# Patient Record
(demographics unavailable — no encounter records)

---

## 2017-01-01 NOTE — PN
[place], Progress Note





- [place] Exam


Weight: 8 lb 10 oz


Chest Circumference: 35


Head Circumference: 35


Vital Signs: 


 Vital Signs











Temperature  98.7 F   05/15/17 22:00


 


Pulse Rate  138   17 07:03


 


Respiratory Rate  40   17 07:03


 


Blood Pressure  64/47   17 12:15


 


O2 Sat by Pulse Oximetry (%)      











General Appearance: Yes: No Abnormalities


Skin: Yes: No Abnormalities, Jaundice


Head: Yes: No Abnormalities


Eyes: Yes: No Abnormalities


Ears: Yes: No Abnormalities


Nose: Yes: No Abnormalities


Mouth: Yes: No Abnormalities


Chest: Yes: No Abnormalities


Lungs/Respiratory: Yes: No Abnormalities


Cardiac: Yes: No Abnormalities


Abdomen: Yes: No Abnormalities


Gastrointestinal: Yes: No Abnormalities


Genitalia: No Abnormalities


Genitalia, Male: Yes: Bilateral testes descended


Anus: Yes: No Abnormalities


Extremities: Yes: No Abnormalities


Russell Test: Negative


Ortolani Test: Negative


Femoral Pulse: Strong


Spine: Yes: No Abnormalities


Reflexes: [place]: Present, Rooting: Present, Sucking: Present


Neuro: Yes: No Abnormalities, Alert, Active


Cry: No Abnormalities, Strong





- Other Data/Findings


Labs, Other Data: 


 Intake





Intake, Oral Amount              30


Intake, Oral Amount              10


Intake, Oral Amount              10


Intake, Oral Amount              35





 Output





Number of Voids                  1


Number of Voids                  1


Stool Size                       Moderate


Stool Size                       Small


Stool Size                       Large


[place] Stool Description        Transistional,Pasty


[place] Stool Description        Transistional,Soft


 Stool Description        Transistional,Soft





 Baby's Blood Type, Jose Elias











Cord Blood Type  O POSITIVE   17  06:05    


 


DANIEL, Poly Interpret  Negative  (NEGATIVE)   17  06:05    











Other Findings/Remarks: 


Well  Boy 


Physiologic Jaundice


TC bili 9 this AM


BIlirubin tomorrow AM


Continue Current Care


Discussed with mother








Problem List





- Problems


(1) Single liveborn, born in hospital, delivered by  section


Code(s): Z38.01 - SINGLE LIVEBORN INFANT, DELIVERED BY

## 2017-01-01 NOTE — HP
- Maternal History


Mother's Age: 28yo


 Status: 


Mother's Blood Type: Opos


HBSAG: Negative


Date: 16


RPR: Negative


Date: 16


Group B Strep: Negative


HIV: Negative





- Maternal Risks


OB Risks: post dates obese pre diabetic





 Data





- Admission


Date of Admission: 17


Admission Time: 06:15


Date of Delivery: 17


Time of Delivery: 06:05


Wks Gestation by Dates: 41.4


Wks Gestation by Sono: 41.5


Infant Gender: Male


Type of Delivery: Primary C/S


Apgar Score @1 Minute: 9


Apgar score @ 5 Minutes: 9


Birth Weight: 9 lb


Birth Length: 20.5 in


Head Circumference, Admission: 35


Chest Circumference: 35


Abdominal Girth: 34





- Labs


Labs: 


 Baby's Blood Type, Jose Elias











Cord Blood Type  O POSITIVE   17  06:05    


 


DANIEL, Poly Interpret  Negative  (NEGATIVE)   17  06:05    














 Infant, Physical Exam





-  Infant, Admission Exam


Birth Weight: 9 lb


Birth Length: 20.5 in


Chest Circumference: 35


Initial Vital Signs: 


 Initial Vital Signs











Temp Pulse Resp


 


 97.9 F   138   40 


 


 17 07:03  17 07:03  17 07:03











General Appearance: Yes: No Abnormalities


Skin: Yes: No Abnormalities


Head: Yes: No Abnormalities


Eyes: Yes: No Abnormalities


Ears: Yes: No Abnormalities


Nose: Yes: No Abnormalities


Mouth: Yes: No Abnormalities


Chest: Yes: No Abnormalities


Lungs/Respiratory: Yes: No Abnormalities


Cardiac: Yes: No Abnormalities


Abdomen: Yes: No Abnormalities


Gastrointestinal: Yes: No Abnormalities


Genitalia: No Abnormalities


Genitalia, Male: Yes: Bilateral testes descended


Anus: Yes: No Abnormalities


Extremities: Yes: No Abnormalities


Clavicles: No abnormalities


Spine: Yes: No Abnormalities


Neuro: Yes: No Abnormalities


Cry: Yes: No Abnormalities





- Other Findings/Remarks


Other Findings/Remarks: 


Patient is a well . Continue routine care.


C/S

## 2017-01-01 NOTE — PN
Progress Note (short form)





- Note


Progress Note: 


This is 41 4/7 wks baby boy born to 29yr  via c/s due to Cat II tracing

, cord around the neck, cried well after birth. No active resuscitation. Apgar 

score 9 and 9.


MPMH:unremarkable





General Appearance: Yes: No Abnormalities, Full ROM, Spontaneous movements, Pink


Skin: Yes: No Abnormalities


Head: Yes: Molding


Eyes: Yes: No Abnormalities, Clear, Red reflex deferred


Ears: Yes: No Abnormalities, Symmetrical


Nose: Yes: No Abnormalities


Mouth: Yes: No Abnormalities


Chest: Yes: No Abnormalities, Symmetrical


Cardiac: Yes: No Abnormalities, Other (Si and S2 normal, no murmur)


Abdomen: Yes: No Abnormalities


Gastrointestinal: Yes: No Abnormalities


Genitalia: No Abnormalities


Genitalia, Male: Yes: Bilateral testes descended, Penis appears normal


Anus: Yes: No Abnormalities, Patent


Extremities: Yes: No Abnormalities, 10 Fingers, 10 Toes


Spine: Yes: No Abnormalities


Reflexes: [place]: Present, grasp +


Neuro: Yes: No Abnormalities, Alert, Active


Cry: No Abnormalities, Strong





Impression:


well 


Plan:


Routine  care

## 2017-01-01 NOTE — PN
Progress Note (short form)





- Note


Progress Note: 


Circumcision Note





After assuring informed consent baby placed on the circumcision table


Preped and draped in a sterile fashion


0.7cc 1% Lidocane injected into the dorsum of the Penus


Gamco 1.3 applied


Excellent hemostats noted 


Returned to [facility] stable

## 2017-01-01 NOTE — DS
- Maternal History


Mother's Age: 30yo


 Status: 


Mother's Blood Type: Opos


HBSAG: Negative


Date: 16


RPR: Negative


Date: 16


Group B Strep: Negative


HIV: Negative





- Maternal Risks


OB Risks: post dates obese pre diabetic





 Data





- Admission


Date of Admission: 17


Admission Time: 06:15


Date of Delivery: 17


Time of Delivery: 06:05


Wks Gestation by Dates: 41.4


Wks Gestation by Sono: 41.5


Infant Gender: Male


Type of Delivery: Primary C/S


Reason for C Section: non-reassuring FHR


Apgar Score @1 Minute: 9


Apgar score @ 5 Minutes: 9


Birth Weight: 9 lb


Birth Length: 20.5 in


Head Circumference, Admission: 35


Chest Circumference: 35


Abdominal Girth: 34





- Vital Signs


  ** Left Upper Arm


Blood Pressure: 64/47


Blood Pressure Mean: 52





  ** Right Upper Arm


Blood Pressure: 65/40


Blood Pressure Mean: 48





  ** Left Calf


Blood Pressure: 66/40


Blood Pressure Mean: 48





  ** Right Calf


Blood Pressure: 66/39


Blood Pressure Mean: 48





- Hearing Screen


Left Ear: Passed


Right Ear: Passed


Hearing Screen Complete: 05/15/17





- Labs


Labs: 


 Transcutaneous Bilirubin











Transcutaneous Bilirubin       17





performed                      


 


Transcutaneous Bilirubin       9.9





result                         











 Baby's Blood Type, Jose Elias











Cord Blood Type  O POSITIVE   17  06:05    


 


DANIEL, Poly Interpret  Negative  (NEGATIVE)   17  06:05    














- [facility] Screening


 Screening Card Number: [id number]





- Hepatitis B Vaccine Given


Date: 


17





 PE, Discharge





- Physical Exam


Last Weight Documented: 8 lb 13 oz


Vital Signs: 


 Vital Signs











Temperature  98.4 F   17 09:43


 


Pulse Rate  138   17 07:03


 


Respiratory Rate  40   17 07:03


 


Blood Pressure  64/47   17 12:15


 


O2 Sat by Pulse Oximetry (%)      








 SpO2





Preductal SpO2, Right Arm        100


Postductal SpO2 [Right Leg]      100








General Appearance: Yes: No Abnormalities


Skin: Yes: No Abnormalities, Jaundice


Head: Yes: No Abnormalities


Eyes: Yes: No Abnormalities


Ears: Yes: No Abnormalities


Nose: Yes: No Abnormalities


Mouth: Yes: No Abnormalities


Chest: Yes: No Abnormalities


Lungs/Respiratory: Yes: No Abnormalities


Cardiac: Yes: No Abnormalities


Abdomen: Yes: No Abnormalities


Gastrointestinal: Yes: No Abnormalities


Genitalia: No Abnormalities


Genitalia, Male: Yes: Bilateral testes descended


Anus: Yes: No Abnormalities


Extremities: Yes: No Abnormalities


Spine: Yes: No Abnormalities


Reflexes: [place]: Present, Rooting: Present, Sucking: Present


Neuro: Yes: No Abnormalities, Alert, Active


Cry: Yes: No Abnormalities, Strong


Preductal SpO2, Right Arm: 100


  ** Right Leg


Postductal SpO2: 100


Other Findings/Remarks: 


Well 


Bili 12.9/0.3 today. Feeding well. Office recheck 48hrs.





Discharge Summary


Reason For Visit: 


Current Active Problems





Single liveborn, born in hospital, delivered by  section (Acute) 








Condition: Good





- Instructions


Diet, Activity, Other Instructions: 


The baby has its first appointment to see 


Dayana Stewart and Sruesh at 


[address] [place] [address] ([phone]) on Sat. 17 at 10am.


Frequent feeds. Keep near sunlight prn.


Disposition: HOME

## 2017-01-01 NOTE — PN
[place], Progress Note





- [place] Exam


Weight: 8 lb 9 oz


Chest Circumference: 35


Head Circumference: 35


Vital Signs: 


 Vital Signs











Temperature  98.4 F   17 08:00


 


Pulse Rate  138   17 07:03


 


Respiratory Rate  40   17 07:03


 


Blood Pressure  64/47   17 12:15


 


O2 Sat by Pulse Oximetry (%)      











General Appearance: Yes: No Abnormalities


Skin: Yes: No Abnormalities, Jaundice


Head: Yes: No Abnormalities


Eyes: Yes: No Abnormalities


Ears: Yes: No Abnormalities


Nose: Yes: No Abnormalities


Mouth: Yes: No Abnormalities


Chest: Yes: No Abnormalities


Lungs/Respiratory: Yes: No Abnormalities


Cardiac: Yes: No Abnormalities


Abdomen: Yes: No Abnormalities


Gastrointestinal: Yes: No Abnormalities


Genitalia: No Abnormalities


Genitalia, Male: Yes: Bilateral testes descended


Anus: Yes: No Abnormalities


Extremities: Yes: No Abnormalities


Russell Test: Negative


Ortolani Test: Negative


Femoral Pulse: Strong


Spine: Yes: No Abnormalities


Reflexes: [place]: Present, Rooting: Present, Sucking: Present


Neuro: Yes: No Abnormalities, Alert, Active


Cry: No Abnormalities, Strong





- Other Data/Findings


Labs, Other Data: 


 Intake





Intake, Oral Amount              45


Intake, Oral Amount              30


Intake, Oral Amount              35


Intake, Oral Amount              30


Intake, Oral Amount              35





 Output





Number of Voids                  1


Stool Size                       Moderate


Stool Size                       Small


Stool Size                       Moderate


Stool Size                       Small


Stool Size                       Small


[place] Stool Description        Yellow,Curds


[place] Stool Description        Yellow,Green,Seedy


 Stool Description        Yellow,Green,Seedy





 Transcutaneous Bilirubin











Transcutaneous Bilirubin       17





performed                      


 


Transcutaneous Bilirubin       9.9





result                         











 Baby's Blood Type, Jose Elias











Cord Blood Type  O POSITIVE   17  06:05    


 


DANIEL, Poly Interpret  Negative  (NEGATIVE)   17  06:05    











Other Findings/Remarks: 


Patient is a well . Continue routine care.


Slight jaundice bili 12/0.2 today. Feeding well.


Will repeat bili in am.

## 2017-01-01 NOTE — PN
[place], Progress Note





- [place] Exam


Weight: 8 lb 11 oz


Chest Circumference: 35


Head Circumference: 35


Vital Signs: 


 Vital Signs











Temperature  99.4 F   05/15/17 01:54


 


Pulse Rate  138   17 07:03


 


Respiratory Rate  40   17 07:03


 


Blood Pressure  64/47   17 12:15


 


O2 Sat by Pulse Oximetry (%)      











General Appearance: Yes: No Abnormalities


Skin: Yes: No Abnormalities


Head: Yes: No Abnormalities


Eyes: Yes: No Abnormalities


Ears: Yes: No Abnormalities


Nose: Yes: No Abnormalities


Mouth: Yes: No Abnormalities


Chest: Yes: No Abnormalities


Lungs/Respiratory: Yes: No Abnormalities


Cardiac: Yes: No Abnormalities


Abdomen: Yes: No Abnormalities


Gastrointestinal: Yes: No Abnormalities


Genitalia: No Abnormalities


Genitalia, Male: Yes: Bilateral testes descended


Anus: Yes: No Abnormalities


Extremities: Yes: No Abnormalities


Spine: Yes: No Abnormalities


Reflexes: [place]: Present, Rooting: Present, Sucking: Present


Neuro: Yes: No Abnormalities, Alert, Active


Cry: No Abnormalities, Strong





- Other Data/Findings


Labs, Other Data: 


 Intake





Intake, Oral Amount              20


Intake, Oral Amount              20


Intake, Oral Amount              25





 Output





Number of Voids                  1


Number of Voids                  1


Stool Size                       Moderate


Stool Size                       Large


Stool Size                       Small


Stool Size                       Moderate


Stool Size                       Small


Stool Size                       Large


Stool Size                       Large


 Stool Description        Transistional,Soft


 Stool Description        Green,Soft


[place] Stool Description        Meconium,Soft


[place] Stool Description        Meconium,Pasty


[place] Stool Description        Meconium,Soft


 Stool Description        Meconium,Pasty


 Stool Description        Meconium,Pasty





 Baby's Blood Type, Jose Elias











Cord Blood Type  O POSITIVE   17  06:05    


 


DANIEL, Poly Interpret  Negative  (NEGATIVE)   17  06:05    














Problem List





- Problems


(1) Single liveborn, born in hospital, delivered by  section


Assessment/Plan: 


 Laboratory Tests











  17





  06:05 06:58


 


POC Glucometer   [id number]


 


Cord Blood Type  O POSITIVE 


 


DANIEL, Poly Interpret  Negative 








 Baby's Blood Type, Jose Elias











Cord Blood Type  O POSITIVE   17  06:05    


 


DANIEL, Poly Interpret  Negative  (NEGATIVE)   17  06:05    








Patient is a well . Continue routine care.


Code(s): Z38.01 - SINGLE LIVEBORN INFANT, DELIVERED BY